# Patient Record
Sex: MALE | Race: WHITE
[De-identification: names, ages, dates, MRNs, and addresses within clinical notes are randomized per-mention and may not be internally consistent; named-entity substitution may affect disease eponyms.]

---

## 2018-07-19 ENCOUNTER — HOSPITAL ENCOUNTER (OUTPATIENT)
Dept: HOSPITAL 92 - LAB | Age: 43
End: 2018-07-19
Attending: FAMILY MEDICINE
Payer: COMMERCIAL

## 2018-07-19 DIAGNOSIS — Z00.00: Primary | ICD-10-CM

## 2018-07-19 LAB
ALBUMIN SERPL BCG-MCNC: 4.2 G/DL (ref 3.5–5)
ALP SERPL-CCNC: 57 U/L (ref 40–150)
ALT SERPL W P-5'-P-CCNC: 23 U/L (ref 8–55)
ANION GAP SERPL CALC-SCNC: 14 MMOL/L (ref 10–20)
AST SERPL-CCNC: 23 U/L (ref 5–34)
BASOPHILS # BLD AUTO: 0.1 THOU/UL (ref 0–0.2)
BASOPHILS NFR BLD AUTO: 1.2 % (ref 0–1)
BILIRUB SERPL-MCNC: 0.4 MG/DL (ref 0.2–1.2)
BUN SERPL-MCNC: 10 MG/DL (ref 8.9–20.6)
CALCIUM SERPL-MCNC: 9.2 MG/DL (ref 7.8–10.44)
CHD RISK SERPL-RTO: 3.3 (ref ?–4.5)
CHLORIDE SERPL-SCNC: 108 MMOL/L (ref 98–107)
CHOLEST SERPL-MCNC: 148 MG/DL
CO2 SERPL-SCNC: 21 MMOL/L (ref 22–29)
CREAT CL PREDICTED SERPL C-G-VRATE: 0 ML/MIN (ref 70–130)
CRYSTAL-AUWI FLAG: 0 (ref 0–15)
EOSINOPHIL # BLD AUTO: 0.1 THOU/UL (ref 0–0.7)
EOSINOPHIL NFR BLD AUTO: 2.6 % (ref 0–10)
GLOBULIN SER CALC-MCNC: 3.1 G/DL (ref 2.4–3.5)
GLUCOSE SERPL-MCNC: 112 MG/DL (ref 70–105)
HDLC SERPL-MCNC: 45 MG/DL
HEV IGM SER QL: 0 (ref 0–7.99)
HGB BLD-MCNC: 15.1 G/DL (ref 14–18)
HYALINE CASTS #/AREA URNS LPF: (no result) LPF
LDLC SERPL CALC-MCNC: 56 MG/DL
LYMPHOCYTES # BLD: 2.1 THOU/UL (ref 1.2–3.4)
LYMPHOCYTES NFR BLD AUTO: 36.9 % (ref 21–51)
MCH RBC QN AUTO: 32.8 PG (ref 27–31)
MCV RBC AUTO: 91.8 FL (ref 78–98)
MONOCYTES # BLD AUTO: 0.6 THOU/UL (ref 0.11–0.59)
MONOCYTES NFR BLD AUTO: 10 % (ref 0–10)
NEUTROPHILS # BLD AUTO: 2.8 THOU/UL (ref 1.4–6.5)
NEUTROPHILS NFR BLD AUTO: 49.4 % (ref 42–75)
PATHC CAST-AUWI FLAG: 0 (ref 0–2.49)
PLATELET # BLD AUTO: 219 THOU/UL (ref 130–400)
POTASSIUM SERPL-SCNC: 3.7 MMOL/L (ref 3.5–5.1)
RBC # BLD AUTO: 4.6 MILL/UL (ref 4.7–6.1)
RBC UR QL AUTO: (no result) HPF (ref 0–3)
SODIUM SERPL-SCNC: 139 MMOL/L (ref 136–145)
SP GR UR STRIP: 1 (ref 1–1.04)
SPERM-AUWI FLAG: 0 (ref 0–9.9)
TRIGL SERPL-MCNC: 237 MG/DL (ref ?–150)
WBC # BLD AUTO: 5.6 THOU/UL (ref 4.8–10.8)
YEAST-AUWI FLAG: 0 (ref 0–25)

## 2018-07-19 PROCEDURE — 85025 COMPLETE CBC W/AUTO DIFF WBC: CPT

## 2018-07-19 PROCEDURE — 36415 COLL VENOUS BLD VENIPUNCTURE: CPT

## 2018-07-19 PROCEDURE — 84443 ASSAY THYROID STIM HORMONE: CPT

## 2018-07-19 PROCEDURE — 80053 COMPREHEN METABOLIC PANEL: CPT

## 2018-07-19 PROCEDURE — 80061 LIPID PANEL: CPT

## 2018-07-19 PROCEDURE — 81001 URINALYSIS AUTO W/SCOPE: CPT

## 2020-12-01 ENCOUNTER — HOSPITAL ENCOUNTER (OUTPATIENT)
Dept: HOSPITAL 92 - ULT | Age: 45
Discharge: HOME | End: 2020-12-01
Attending: UROLOGY
Payer: COMMERCIAL

## 2020-12-01 DIAGNOSIS — N50.3: ICD-10-CM

## 2020-12-01 DIAGNOSIS — N50.89: Primary | ICD-10-CM

## 2020-12-01 PROCEDURE — 93976 VASCULAR STUDY: CPT

## 2020-12-01 PROCEDURE — 76870 US EXAM SCROTUM: CPT

## 2020-12-01 NOTE — ULT
Exam: Testicular ultrasound



HISTORY: Left scrotal mass.



COMPARISON: None



TECHNIQUE: Sagittal and transverse imaging of the left and right hemiscrotum are performed. Testicula
r Doppler is performed with grayscale, color-flow, Doppler imaging and spectral waveform analysis.



FINDINGS:

Right hemiscrotum:

Testicle: Homogeneous echotexture. No intratesticular masses.

Right testicle measurements: 3.3 x 5.0 x 3.3

Right epididymis: Normal echotexture.

Right epididymis measurements: 1.2 x 1.4 cm

Hydrocele: Small

Left hemiscrotum:

Left testicle: Homogeneous echotexture. No intratesticular masses.

Left testicle measurements: 5.2 x 2.8 x 3 point cm

Left epididymis: Multiple anechoic foci occupy the left epididymis, compatible with multiple epididym
al cysts. Representative cyst measures 0.8 x 0.9 x 0.9 cm. Additional smaller cysts are identified.

Left epididymis measurements:0.9 x 1.5 cm

Hydrocele: Small



Testicular Doppler: There is symmetric vascular flow to the left and right testicle. 





IMPRESSION:



1. Multiple left epididymal cysts.



Reported By: Carlton Marie 

Electronically Signed:  12/1/2020 10:17 AM